# Patient Record
Sex: FEMALE | Race: BLACK OR AFRICAN AMERICAN | NOT HISPANIC OR LATINO | URBAN - METROPOLITAN AREA
[De-identification: names, ages, dates, MRNs, and addresses within clinical notes are randomized per-mention and may not be internally consistent; named-entity substitution may affect disease eponyms.]

---

## 2017-02-16 NOTE — PATIENT DISCUSSION
DRY EYES : Discussed with patient the importance of keeping the eye moist and the symptoms associated with dry eyes including blurry vision, tearing, burning, and marlee sensation. Advised patient to minimize use of any fans blowing directly on the face. Advised patient to continue with artificial tears 2-3 times daily.

## 2017-02-16 NOTE — PATIENT DISCUSSION
POSTERIOR CAPSULAR FIBROSIS, OU - VISUALLY SIGNIFICANT. SCHEDULE YAG CAPSULOTOMY OS FIRST THEN LATER YAG CAPSULOTOMY OD IF VISUAL SYMPTOMS PERSIST.

## 2017-02-16 NOTE — PATIENT DISCUSSION
PCO OS:Discussed options with patient yag vs. follow. Risks, benefits, and alternatives include anesthesia, bleeding, infection, and inflammation. Pt understands and desires to improve vision OS.   Schedule Yag OS

## 2017-03-07 NOTE — PATIENT DISCUSSION
PCO OD:Discussed options with patient yag vs. follow. Risks, benefits, and alternatives include anesthesia, bleeding, infection, and inflammation. Pt understands and desires to improve vision OD.   Schedule Yag OD

## 2017-05-26 ENCOUNTER — IMPORTED ENCOUNTER (OUTPATIENT)
Dept: URBAN - METROPOLITAN AREA CLINIC 9 | Facility: CLINIC | Age: 52
End: 2017-05-26

## 2017-06-09 ENCOUNTER — IMPORTED ENCOUNTER (OUTPATIENT)
Dept: URBAN - METROPOLITAN AREA CLINIC 9 | Facility: CLINIC | Age: 52
End: 2017-06-09

## 2017-06-15 ENCOUNTER — IMPORTED ENCOUNTER (OUTPATIENT)
Dept: URBAN - METROPOLITAN AREA CLINIC 9 | Facility: CLINIC | Age: 52
End: 2017-06-15

## 2017-06-21 ENCOUNTER — IMPORTED ENCOUNTER (OUTPATIENT)
Dept: URBAN - METROPOLITAN AREA CLINIC 9 | Facility: CLINIC | Age: 52
End: 2017-06-21

## 2017-06-23 ENCOUNTER — IMPORTED ENCOUNTER (OUTPATIENT)
Dept: URBAN - METROPOLITAN AREA CLINIC 9 | Facility: CLINIC | Age: 52
End: 2017-06-23

## 2017-06-30 ENCOUNTER — IMPORTED ENCOUNTER (OUTPATIENT)
Dept: URBAN - METROPOLITAN AREA CLINIC 9 | Facility: CLINIC | Age: 52
End: 2017-06-30

## 2017-07-31 ENCOUNTER — IMPORTED ENCOUNTER (OUTPATIENT)
Dept: URBAN - METROPOLITAN AREA CLINIC 9 | Facility: CLINIC | Age: 52
End: 2017-07-31

## 2018-07-10 NOTE — PATIENT DISCUSSION
right eye, asymptomatic: advised patient to use a gel drop or OTC ointment at bedtime to prevent the eyelid from sticking to the cornea when opening eyes in the mornings and causing an abrasion to the eye.

## 2018-07-10 NOTE — PATIENT DISCUSSION
DRY EYES : Discussed with patient the importance of keeping the eye moist and the symptoms associated with dry eyes including blurry vision, tearing, burning, and marlee sensation. Tear Lab was performed today to evaluate the severity of dryness. Advised patient to minimize use of any fans blowing directly on the face. Advised patient to continue with over the counter artificial tears 2-3 times daily.

## 2018-08-09 ENCOUNTER — IMPORTED ENCOUNTER (OUTPATIENT)
Dept: URBAN - METROPOLITAN AREA CLINIC 9 | Facility: CLINIC | Age: 53
End: 2018-08-09

## 2018-08-21 ENCOUNTER — IMPORTED ENCOUNTER (OUTPATIENT)
Dept: URBAN - METROPOLITAN AREA CLINIC 9 | Facility: CLINIC | Age: 53
End: 2018-08-21

## 2018-10-29 ENCOUNTER — IMPORTED ENCOUNTER (OUTPATIENT)
Dept: URBAN - METROPOLITAN AREA CLINIC 9 | Facility: CLINIC | Age: 53
End: 2018-10-29

## 2019-08-22 ENCOUNTER — IMPORTED ENCOUNTER (OUTPATIENT)
Dept: URBAN - METROPOLITAN AREA CLINIC 9 | Facility: CLINIC | Age: 54
End: 2019-08-22

## 2020-10-26 ENCOUNTER — IMPORTED ENCOUNTER (OUTPATIENT)
Dept: URBAN - METROPOLITAN AREA CLINIC 9 | Facility: CLINIC | Age: 55
End: 2020-10-26

## 2020-10-26 PROBLEM — H43.813: Noted: 2020-10-26

## 2020-10-26 PROBLEM — H40.013: Noted: 2020-10-26

## 2020-10-26 PROBLEM — H25.13: Noted: 2020-10-26

## 2021-10-16 ASSESSMENT — TONOMETRY
OD_IOP_MMHG: 13
OS_IOP_MMHG: 14
OD_IOP_MMHG: 14
OD_IOP_MMHG: 14
OD_IOP_MMHG: 19
OD_IOP_MMHG: 12
OD_IOP_MMHG: 11
OD_IOP_MMHG: 13
OD_IOP_MMHG: 10
OD_IOP_MMHG: 13
OS_IOP_MMHG: 13
OD_IOP_MMHG: 15
OS_IOP_MMHG: 13
OS_IOP_MMHG: 15
OS_IOP_MMHG: 21
OS_IOP_MMHG: 16
OD_IOP_MMHG: 15
OS_IOP_MMHG: 14
OS_IOP_MMHG: 19
OS_IOP_MMHG: 12
OS_IOP_MMHG: 13
OS_IOP_MMHG: 12

## 2021-10-16 ASSESSMENT — VISUAL ACUITY
OD_SC: 20/20 SN
OD_SC: 20/25 -2 SN
OS_SC: 20/30 - SN
OD_SC: 20/25 -2 SN
OD_SC: 20/25 -2 SN
OD_SC: 20/20 SN
OD_SC: 20/20 -2 SN
OS_SC: 20/30 -2 SN
OS_SC: 20/25 - SN
OD_SC: 20/25 SN
OD_SC: 20/20 SN
OS_SC: 20/20 -2 SN
OS_SC: 20/30 - SN
OD_CC: 20/20 SN
OS_SC: 20/25 - SN
OS_SC: 20/30 SN
OD_SC: 20/25 - SN
OS_CC: 20/20 SN
OS_SC: 20/30 SN
OS_SC: 20/25 SN
OS_SC: 20/20 +2 SN
OS_SC: 20/20 - SN
OD_SC: 20/25 SN
OD_CC: 20/20 SN
OS_CC: 20/20 SN
OD_SC: 20/25 - SN

## 2021-10-16 ASSESSMENT — PACHYMETRY
OD_CT_UM: 478.0
OS_CT_UM: 474.0

## 2022-02-04 ENCOUNTER — ESTABLISHED PATIENT (OUTPATIENT)
Dept: URBAN - METROPOLITAN AREA CLINIC 4 | Facility: CLINIC | Age: 57
End: 2022-02-04

## 2022-02-04 DIAGNOSIS — H40.013: ICD-10-CM

## 2022-02-04 DIAGNOSIS — H25.13: ICD-10-CM

## 2022-02-04 PROCEDURE — 92020 GONIOSCOPY: CPT

## 2022-02-04 PROCEDURE — 99214 OFFICE O/P EST MOD 30 MIN: CPT

## 2022-02-04 PROCEDURE — 92015 DETERMINE REFRACTIVE STATE: CPT

## 2022-02-04 PROCEDURE — 92250 FUNDUS PHOTOGRAPHY W/I&R: CPT

## 2022-02-04 ASSESSMENT — TONOMETRY
OD_IOP_MMHG: 12
OS_IOP_MMHG: 11

## 2022-02-04 ASSESSMENT — VISUAL ACUITY
OD_CC: 20/20
OS_CC: 20/20

## 2022-03-07 ENCOUNTER — TECH ONLY (OUTPATIENT)
Dept: URBAN - METROPOLITAN AREA CLINIC 4 | Facility: CLINIC | Age: 57
End: 2022-03-07

## 2022-03-07 DIAGNOSIS — H40.013: ICD-10-CM

## 2022-03-07 PROCEDURE — 92133 CPTRZD OPH DX IMG PST SGM ON: CPT

## 2022-03-07 PROCEDURE — 92083 EXTENDED VISUAL FIELD XM: CPT

## 2022-05-11 ENCOUNTER — ESTABLISHED PATIENT (OUTPATIENT)
Dept: URBAN - METROPOLITAN AREA CLINIC 4 | Facility: CLINIC | Age: 57
End: 2022-05-11

## 2022-05-11 DIAGNOSIS — S05.01XA: ICD-10-CM

## 2022-05-11 PROCEDURE — 99213 OFFICE O/P EST LOW 20 MIN: CPT

## 2022-05-11 RX ORDER — OFLOXACIN 3 MG/ML: 1 SOLUTION/ DROPS OPHTHALMIC

## 2022-05-11 ASSESSMENT — TONOMETRY
OD_IOP_MMHG: 16
OS_IOP_MMHG: 13

## 2022-05-11 ASSESSMENT — VISUAL ACUITY
OD_SC: 20/30
OS_SC: 20/25-2

## 2022-05-18 ENCOUNTER — FOLLOW UP (OUTPATIENT)
Dept: URBAN - METROPOLITAN AREA CLINIC 4 | Facility: CLINIC | Age: 57
End: 2022-05-18

## 2022-05-18 DIAGNOSIS — S05.01XA: ICD-10-CM

## 2022-05-18 PROCEDURE — 99213 OFFICE O/P EST LOW 20 MIN: CPT

## 2022-05-18 ASSESSMENT — VISUAL ACUITY
OD_CC: 20/25
OS_CC: 20/25

## 2022-05-18 ASSESSMENT — TONOMETRY
OS_IOP_MMHG: 16
OD_IOP_MMHG: 14

## 2023-08-03 ENCOUNTER — ESTABLISHED PATIENT (OUTPATIENT)
Dept: URBAN - METROPOLITAN AREA CLINIC 4 | Facility: CLINIC | Age: 58
End: 2023-08-03

## 2023-08-03 DIAGNOSIS — H04.123: ICD-10-CM

## 2023-08-03 DIAGNOSIS — H25.13: ICD-10-CM

## 2023-08-03 DIAGNOSIS — H40.013: ICD-10-CM

## 2023-08-03 PROCEDURE — 92015 DETERMINE REFRACTIVE STATE: CPT

## 2023-08-03 PROCEDURE — 92020 GONIOSCOPY: CPT

## 2023-08-03 PROCEDURE — 99214 OFFICE O/P EST MOD 30 MIN: CPT

## 2023-08-03 ASSESSMENT — VISUAL ACUITY
OS_GLARE: 20/60
OD_GLARE: 20/60

## 2023-08-03 ASSESSMENT — TONOMETRY
OD_IOP_MMHG: 15
OS_IOP_MMHG: 14

## 2024-04-25 ENCOUNTER — DIAGNOSTICS ONLY (OUTPATIENT)
Facility: LOCATION | Age: 59
End: 2024-04-25

## 2024-04-25 DIAGNOSIS — H40.013: ICD-10-CM

## 2024-04-25 PROCEDURE — 92133 CPTRZD OPH DX IMG PST SGM ON: CPT

## 2024-04-25 PROCEDURE — 92083 EXTENDED VISUAL FIELD XM: CPT

## 2024-10-17 ENCOUNTER — COMPREHENSIVE EXAM (OUTPATIENT)
Dept: URBAN - METROPOLITAN AREA CLINIC 18 | Facility: CLINIC | Age: 59
End: 2024-10-17

## 2024-10-17 DIAGNOSIS — H04.123: ICD-10-CM

## 2024-10-17 DIAGNOSIS — H43.813: ICD-10-CM

## 2024-10-17 DIAGNOSIS — H25.813: ICD-10-CM

## 2024-10-17 DIAGNOSIS — H40.013: ICD-10-CM

## 2024-10-17 DIAGNOSIS — H01.02A: ICD-10-CM

## 2024-10-17 DIAGNOSIS — H01.02B: ICD-10-CM

## 2024-10-17 PROCEDURE — 99214 OFFICE O/P EST MOD 30 MIN: CPT

## 2024-10-17 PROCEDURE — 92015 DETERMINE REFRACTIVE STATE: CPT

## 2024-10-21 ENCOUNTER — CONSULTATION/EVALUATION (OUTPATIENT)
Facility: LOCATION | Age: 59
End: 2024-10-21

## 2024-10-21 DIAGNOSIS — H40.013: ICD-10-CM

## 2024-10-21 DIAGNOSIS — H25.813: ICD-10-CM

## 2024-10-21 PROCEDURE — 92014 COMPRE OPH EXAM EST PT 1/>: CPT

## 2024-10-21 PROCEDURE — 92136 OPHTHALMIC BIOMETRY: CPT

## 2025-04-30 ENCOUNTER — CONSULTATION/EVALUATION (OUTPATIENT)
Age: 60
End: 2025-04-30

## 2025-04-30 DIAGNOSIS — H43.813: ICD-10-CM

## 2025-04-30 DIAGNOSIS — H40.013: ICD-10-CM

## 2025-04-30 DIAGNOSIS — H25.813: ICD-10-CM

## 2025-04-30 PROCEDURE — 92134 CPTRZ OPH DX IMG PST SGM RTA: CPT

## 2025-04-30 PROCEDURE — 99215 OFFICE O/P EST HI 40 MIN: CPT

## 2025-06-04 ENCOUNTER — SURGERY/PROCEDURE (OUTPATIENT)
Age: 60
End: 2025-06-04

## 2025-06-04 DIAGNOSIS — H25.813: ICD-10-CM

## 2025-06-04 PROBLEM — H40.013: Noted: 2020-10-26

## 2025-06-04 PROBLEM — Z96.1: Noted: 2025-06-04

## 2025-06-04 PROBLEM — H43.813: Noted: 2020-10-26

## 2025-06-04 PROCEDURE — 66984 XCAPSL CTRC RMVL W/O ECP: CPT

## 2025-06-05 ENCOUNTER — POST OP/EVAL OF SECOND EYE (OUTPATIENT)
Age: 60
End: 2025-06-05

## 2025-06-05 DIAGNOSIS — Z96.1: ICD-10-CM

## 2025-06-05 DIAGNOSIS — H40.013: ICD-10-CM

## 2025-06-05 DIAGNOSIS — H25.811: ICD-10-CM

## 2025-06-12 ENCOUNTER — POST-OP (OUTPATIENT)
Age: 60
End: 2025-06-12

## 2025-06-12 DIAGNOSIS — H40.013: ICD-10-CM

## 2025-06-12 DIAGNOSIS — Z96.1: ICD-10-CM

## 2025-06-12 PROCEDURE — 99024 POSTOP FOLLOW-UP VISIT: CPT

## 2025-06-19 ENCOUNTER — SURGERY/PROCEDURE (OUTPATIENT)
Age: 60
End: 2025-06-19

## 2025-06-19 DIAGNOSIS — H25.813: ICD-10-CM

## 2025-06-19 PROBLEM — H43.813: Noted: 2020-10-26

## 2025-06-19 PROBLEM — Z96.1: Noted: 2025-06-04

## 2025-06-19 PROBLEM — H40.013: Noted: 2020-10-26

## 2025-06-19 PROBLEM — Z96.1: Noted: 2025-06-19

## 2025-06-19 PROCEDURE — 66984 XCAPSL CTRC RMVL W/O ECP: CPT | Mod: 79,RT

## 2025-06-20 ENCOUNTER — POST-OP (OUTPATIENT)
Age: 60
End: 2025-06-20

## 2025-06-20 DIAGNOSIS — Z96.1: ICD-10-CM

## 2025-06-26 ENCOUNTER — POST-OP (OUTPATIENT)
Age: 60
End: 2025-06-26

## 2025-06-26 DIAGNOSIS — Z96.1: ICD-10-CM

## 2025-06-26 PROCEDURE — 99024 POSTOP FOLLOW-UP VISIT: CPT

## 2025-07-24 ENCOUNTER — POST-OP (OUTPATIENT)
Age: 60
End: 2025-07-24

## 2025-07-24 DIAGNOSIS — Z96.1: ICD-10-CM

## 2025-07-24 PROCEDURE — 99024 POSTOP FOLLOW-UP VISIT: CPT
